# Patient Record
Sex: FEMALE | Race: WHITE
[De-identification: names, ages, dates, MRNs, and addresses within clinical notes are randomized per-mention and may not be internally consistent; named-entity substitution may affect disease eponyms.]

---

## 2017-07-07 ENCOUNTER — HOSPITAL ENCOUNTER (OUTPATIENT)
Dept: HOSPITAL 53 - M WHC | Age: 67
End: 2017-07-07
Attending: NURSE PRACTITIONER
Payer: MEDICARE

## 2017-07-07 DIAGNOSIS — Z92.0: ICD-10-CM

## 2017-07-07 DIAGNOSIS — Z12.31: Primary | ICD-10-CM

## 2017-07-07 DIAGNOSIS — R92.8: ICD-10-CM

## 2017-07-07 DIAGNOSIS — Z78.0: ICD-10-CM

## 2017-07-07 NOTE — REPMRS
Patient History

The patient states she had a clinical breast exam in 07/17

Patient is postmenopausal.

Family history of prostate cancer in father at age 70.

Benign excisional biopsy of both breasts, 2000.

Took hormonal contraceptives for 25 years.

 

Digital Woman Screen Mammo: July 7, 2017 - Exam #: 

[id number]

Bilateral CC and MLO view(s) were taken.

 

Technologist: Josephine Cervantes, Technologist

Prior study comparison: July 8, 2016, left breast digital mammo 

diagnostic unilateral, performed at [facility].  

July 7, 2016, digital woman screen mammo performed at [facility] 

[facility].  October 15, 2014, bilateral bilat screen digital

mammo, performed at [facility] ([facility]).

 

FINDINGS: There are scattered fibroglandular densities.  

Previously noted left lateral breast cyst is again seen, slightly

more prominent.  This was evaluated by ultrasound July 8, 2016. 

There has been no change in the appearance of the mammogram from 

the prior studies.  There is a mild amount of scattered 

fibroglandular density which is fairly symmetric. There is no 

interval development of dominant mass, architectural distortion, 

or clustered microcalcification suggestive of malignancy.

 

ASSESSMENT: BI-RADS/ACR category 2 mammogram. Benign finding(s).

 

Recommendation

Routine screening mammogram in 1 year (for women over age 40).

This mammogram was interpreted with the aid of an FDA-approved 

computer-aided dectection system.

 

Electronically Signed By: Ander Zelaya MD 07/07/17 1300

## 2018-08-01 ENCOUNTER — HOSPITAL ENCOUNTER (OUTPATIENT)
Dept: HOSPITAL 53 - M WHC | Age: 68
End: 2018-08-01
Attending: NURSE PRACTITIONER
Payer: MEDICARE

## 2018-08-01 DIAGNOSIS — Z12.31: Primary | ICD-10-CM

## 2018-08-01 DIAGNOSIS — Z92.0: ICD-10-CM

## 2018-08-01 DIAGNOSIS — R92.8: ICD-10-CM

## 2018-08-01 DIAGNOSIS — Z98.890: ICD-10-CM

## 2018-08-01 PROCEDURE — 77067 SCR MAMMO BI INCL CAD: CPT

## 2019-08-02 ENCOUNTER — HOSPITAL ENCOUNTER (OUTPATIENT)
Dept: HOSPITAL 53 - M WHC | Age: 69
End: 2019-08-02
Attending: NURSE PRACTITIONER
Payer: MEDICARE

## 2019-08-02 DIAGNOSIS — M85.88: ICD-10-CM

## 2019-08-02 DIAGNOSIS — Z12.31: Primary | ICD-10-CM

## 2019-08-02 PROCEDURE — 77063 BREAST TOMOSYNTHESIS BI: CPT

## 2019-08-02 PROCEDURE — 77080 DXA BONE DENSITY AXIAL: CPT

## 2019-08-02 PROCEDURE — 77067 SCR MAMMO BI INCL CAD: CPT

## 2019-08-02 NOTE — REP
BILATERAL SCREENING DIGITAL MAMMOGRAM WITH 3D TOMOSYNTHESIS:

 

There are no palpable abnormalities or other breast complaints.

The the patient states she had a clinical breast examination 8/20 19.

The the patient states she performs self-breast examinations four times per

year.

The Tyrer-Cuzick Score is: 4.7%.  .

 

Comparison is 10/15/2014 and 07/07/2017.

 

There are scattered areas of fibroglandular density.

There is no dominant mass, micro calcific cluster or architectural distortion

that would indicate malignancy. There are stable sharply circumscribed nodular

densities in the left breast, unchanged.

There are no additional findings on 3D tomosynthesiss.

There is no change from the prior study.

 

Impression:

BIRADS/ACR category 2 mammogram.

Benign Findings .

 

Recommendation:

Routine annual screening mammography.

 

This mammogram was interpreted with the aid of a FDA approved computer-aided

detection system.

 

A. Negative mammogram reports should not delay biopsy if a dominant or clinically

suspicious mass is present.

B. Not all breast cancers are identified by mammography or tomosynthesis.

C.  Adenosis and dense breasts may obscure an underlying neoplasm.

 

Patient letter M1.

 

 

Electronically Signed by

Damir Soliz MD 08/02/2019 03:15 P

## 2019-08-08 NOTE — DEXA
AP SPINE   L1 - L4   0.948   -1.9      -0.3

LT FEMUR   TOTAL   0.893   -0.9       0.4

LT NECK      0.843   -1.4       0.2

RT FEMUR   TOTAL   0.862   -1.2       0.2      

RT NECK      0.796   -1.7      -0.1

TOTAL BODY   TOTAL

OTHER



COMMENTS:

There is low bone density of the spine and hips.

The decreased density of the spine does not represent a significant change.

The increased density of the left hip does not represent a significant change.

The decreased density of the right hip does not represent a significant change.

The density of the spine has decreased 17.8% since the initial exam on 
05/14/2002.

The spine density has decreased 0.6% since the most recent exam on 10/21/2016.

The density of the left hip has decreased 13.2% since the initial exam on 
05/14/2002.

The density of the left hip has increased 0.3% since the most recent exam on 
10/21/2016.

The density of the right hip has decreased 12.5% since the initial exam on 
05/14/2002.

The density of the right hip has decreased 1.1% since the most recent exam on 
10/21/2016.



FOLLOW-UP:

Recommendation for the next bone density exam: 2 years.



MIRELA

## 2020-08-03 ENCOUNTER — HOSPITAL ENCOUNTER (OUTPATIENT)
Dept: HOSPITAL 53 - M WHC | Age: 70
End: 2020-08-03
Attending: NURSE PRACTITIONER
Payer: MEDICARE

## 2020-08-03 DIAGNOSIS — Z12.31: Primary | ICD-10-CM

## 2020-08-03 DIAGNOSIS — Z86.018: ICD-10-CM

## 2020-08-03 DIAGNOSIS — Z92.0: ICD-10-CM

## 2020-08-03 DIAGNOSIS — Z78.0: ICD-10-CM

## 2020-08-03 DIAGNOSIS — Z80.42: ICD-10-CM

## 2020-08-03 PROCEDURE — 77067 SCR MAMMO BI INCL CAD: CPT

## 2020-08-03 PROCEDURE — 77063 BREAST TOMOSYNTHESIS BI: CPT

## 2020-08-20 ENCOUNTER — HOSPITAL ENCOUNTER (OUTPATIENT)
Dept: HOSPITAL 53 - M LAB REF | Age: 70
End: 2020-08-20
Attending: INTERNAL MEDICINE
Payer: MEDICARE

## 2020-08-20 DIAGNOSIS — E04.2: Primary | ICD-10-CM

## 2020-08-24 NOTE — REPMRS
Patient History

The patient states she had a clinical breast exam in 08/2020.

Patient is postmenopausal.

Family history of prostate cancer at age 70 in father.

Benign excisional biopsy of both breasts, 2000.

Took hormonal contraceptives for 25 years.

 

Digital Woman Screen Mammo: August 3, 2020 - Exam #: 

[id number]

Bilateral CC and MLO view(s) were taken.

 

Technologist: Ena Negrete, Technologist

Prior study comparison: August 2, 2019, bilateral digital woman 

screen mammo performed at [facility].  August 1, 2018, bilateral digital woman screen mammo

performed at [facility].  

July 7, 2017, digital woman screen mammo performed at [facility].

 

FINDINGS: There are scattered fibroglandular densities.  There 

has been no change in the appearance of the mammogram from the 

prior studies. There are stable nodular densities in the left 

breast.  There is a mild amount of scattered fibroglandular 

density which is fairly symmetric. There is no interval 

development of dominant mass, architectural distortion, or 

grouped microcalcification suggestive of malignancy.

3-D tomosynthesis shows no additional findings.

 

Report was delayed due to a protracted computer network 

disruption experienced by this facility.

 

Assessment: BI-RADS/ACR category 2 mammogram. Benign Findings.

 

Recommendation

Routine screening mammogram of both breasts in 1 year (for women 

over age 40).

This patient's Lifetime Breast Cancer Risk is estimated at 4.5 %.

This mammogram was interpreted with the aid of an FDA-approved 

computer-aided dectection system.

 

Electronically Signed By: Ander Zelaya MD 08/24/20 2917